# Patient Record
Sex: FEMALE | URBAN - METROPOLITAN AREA
[De-identification: names, ages, dates, MRNs, and addresses within clinical notes are randomized per-mention and may not be internally consistent; named-entity substitution may affect disease eponyms.]

---

## 2023-06-02 ENCOUNTER — OFFICE VISIT (OUTPATIENT)
Dept: OCCUPATIONAL MEDICINE | Age: 18
End: 2023-06-02
Attending: PHYSICIAN ASSISTANT

## 2023-06-05 ENCOUNTER — OFFICE VISIT (OUTPATIENT)
Dept: OCCUPATIONAL MEDICINE | Age: 18
End: 2023-06-05
Attending: PHYSICIAN ASSISTANT

## 2024-04-03 ENCOUNTER — HOSPITAL ENCOUNTER (OUTPATIENT)
Age: 19
Discharge: HOME | End: 2024-04-03
Payer: COMMERCIAL

## 2024-04-03 VITALS
BODY MASS INDEX: 21.5 KG/M2 | WEIGHT: 137 LBS | RESPIRATION RATE: 18 BRPM | HEART RATE: 90 BPM | DIASTOLIC BLOOD PRESSURE: 69 MMHG | HEIGHT: 67 IN | OXYGEN SATURATION: 98 % | SYSTOLIC BLOOD PRESSURE: 104 MMHG | TEMPERATURE: 98 F

## 2024-04-03 DIAGNOSIS — R05.1 ACUTE COUGH: Primary | ICD-10-CM

## 2024-04-03 DIAGNOSIS — J06.9 VIRAL URI: ICD-10-CM

## 2024-04-03 LAB
POCT INFLUENZA A: NEGATIVE
POCT INFLUENZA B: NEGATIVE
SARS-COV-2 RNA RESP QL NAA+PROBE: NOT DETECTED

## 2024-04-03 PROCEDURE — 99203 OFFICE O/P NEW LOW 30 MIN: CPT | Performed by: NURSE PRACTITIONER

## 2024-04-03 PROCEDURE — U0002 COVID-19 LAB TEST NON-CDC: HCPCS | Performed by: NURSE PRACTITIONER

## 2024-04-03 PROCEDURE — 87502 INFLUENZA DNA AMP PROBE: CPT | Performed by: NURSE PRACTITIONER

## 2024-04-03 RX ORDER — CITALOPRAM HYDROBROMIDE 10 MG/1
10 TABLET ORAL DAILY
COMMUNITY
Start: 2024-02-20

## 2024-04-03 NOTE — ED PROVIDER NOTES
Patient Seen in: [facility]      History     Chief Complaint   Patient presents with    Cough/URI     Stated Complaint: cough    Subjective:   HPI  Patient is a 18-year-old female who is here today with complaints of cough, congestion that started less than 1 week ago.    Objective:   Past Medical History:   Diagnosis Date    Depression               History reviewed. No pertinent surgical history.             Social History     Socioeconomic History    Marital status: Single   Tobacco Use    Smoking status: Never    Smokeless tobacco: Former   Substance and Sexual Activity    Alcohol use: Never    Drug use: Not Currently              Review of Systems    Positive for stated complaint: cough  Other systems are as noted in HPI.  Constitutional and vital signs reviewed.      All other systems reviewed and negative except as noted above.    Physical Exam     ED Triage Vitals [04/03/24 1021]   /69   Pulse 90   Resp 18   Temp 97.9 °F (36.6 °C)   Temp src Temporal   SpO2 98 %   O2 Device None (Room air)       Current:/69   Pulse 90   Temp 97.9 °F (36.6 °C) (Temporal)   Resp 18   Ht 170.2 cm (5' 7\")   Wt 62.1 kg   LMP 03/13/2024   SpO2 98%   BMI 21.46 kg/m²         Physical Exam  Adult physical exam:     VS: Vital signs reviewed. O2 saturation within normal limits for this patient     General: Patient is awake and alert, oriented to person, place and time. Not in acute distress.      HEENT: Head is normocephalic atraumatic. Pupils reactive bilaterally.  EOMs intact.  No facial droop or slurred speech.  No oral pallor. Mucous membranes moist.      Neck: No cervical lymphadenopathy. No stridor. Supple. No meningsmus.        Lungs: good inspiratory effort. +air entry bilaterally without wheezes, rhonchi, crackles.  No accessory muscle use or tachypnea.       Extremities: No edema.  Pulses 2+ extremities.   Brisk capillary refill noted.      Skin: Normal skin turgor     CNS: Moves all 4  extremities.  Interacts       ED Course     Labs Reviewed   POCT FLU TEST - Normal    Narrative:     This assay is a rapid molecular in vitro test utilizing nucleic acid amplification of influenza A and B viral RNA.   RAPID SARS-COV-2 BY PCR - Normal             I have personally  reviewed available prior medical records for any recent pertinent discharge summaries/testing. Patient/family updated on results and plan, a verbalized understanding and agreement with the plan.  I explained to the patient that emergent conditions may arise and to go to the ER for new, worsening or any persistent conditions. I've explained the importance of taking all medicatons as prescribed, follow up, and return precuations,  All questions answered.    Please note that this report has been produced using speech recognition software and may contain errors related to that system including, but not limited to, errors in grammar, punctuation, and spelling, as well as words and phrases that possibly may have been recognized inappropriately.  If there are any questions or concerns, contact the dictating provider for clarification.         MDM      Patient presents with cough, congestion for a little under 1 week. Nontoxic, does not meet SIRS criteria.   Patient does not have uvula deviation or unilateral tonsillar swelling to indicate tonsillar abscess. No meningsmus or trismus. No dysphagia or difficulty handing secretions. No evidence for otitis media. Patient does not have any respiratory distress.  O2 saturation within normal limits for this patient. Does not appear clinically dehydrated and is tolerating oral intake. Presentation consistent with a URI.  Flu Michelle is negative at this time.  Patient is eating and drinking without difficulty.. Encouraged patient on oral hydration and supportive care. Recommend follow up with PCP.  Return to ED precautions discussed with the patient and family.                                 Medical Decision  Making      Disposition and Plan     Clinical Impression:  1. Acute cough    2. Viral URI         Disposition:  Discharge  4/3/2024 11:00 am    Follow-up:  No follow-up provider specified.        Medications Prescribed:  Discharge Medication List as of 4/3/2024 11:02 AM

## 2024-04-03 NOTE — DISCHARGE INSTRUCTIONS
-Acetaminophen  orally every 4 hours as needed for fever or pain. Do not combine with other products containing acetaminophen.      -Ibuprofen   orally every 6 hours as needed for fever or pain. Take with food. Discontinue use and seek medical attention with blood in vomit or stool, coffee ground vomit, or dark black stool.     -Encourage fluids.     -Use a cool air humidifier as needed.     -May administer 5 mL of organic honey as needed for cough in patients greater than 1 year of age.     Do not administer over-the-counter cough and cold medications without discussing with your physician or the pharmacist as they may not be safe in children.     -Follow-up with a primary care provider within 3 days if fever persists or sooner if necessary.     -Please monitor for and seek medical attention with persistent fever, difficulties breathing, increased work of breathing, abdominal pain, persistent vomiting with concerns for dehydration, or any other concerns.

## 2024-04-03 NOTE — ED INITIAL ASSESSMENT (HPI)
Pt c/o cough, congestion, chest burning with cough started 3/28/24, pt also c/o bilateral eye itching started 4/2/24.

## 2024-06-19 ENCOUNTER — OFFICE VISIT (OUTPATIENT)
Dept: FAMILY MEDICINE CLINIC | Facility: CLINIC | Age: 19
End: 2024-06-19

## 2024-06-19 VITALS
HEIGHT: 67 IN | HEART RATE: 107 BPM | DIASTOLIC BLOOD PRESSURE: 58 MMHG | OXYGEN SATURATION: 98 % | RESPIRATION RATE: 18 BRPM | TEMPERATURE: 97 F | WEIGHT: 130 LBS | SYSTOLIC BLOOD PRESSURE: 94 MMHG | BODY MASS INDEX: 20.4 KG/M2

## 2024-06-19 DIAGNOSIS — J02.9 SORE THROAT: Primary | ICD-10-CM

## 2024-06-19 DIAGNOSIS — J06.9 VIRAL URI: ICD-10-CM

## 2024-06-19 LAB
CONTROL LINE PRESENT WITH A CLEAR BACKGROUND (YES/NO): YES YES/NO
KIT LOT #: NORMAL NUMERIC
STREP GRP A CUL-SCR: NEGATIVE

## 2024-06-19 PROCEDURE — 87880 STREP A ASSAY W/OPTIC: CPT | Performed by: NURSE PRACTITIONER

## 2024-06-19 PROCEDURE — 3078F DIAST BP <80 MM HG: CPT | Performed by: NURSE PRACTITIONER

## 2024-06-19 PROCEDURE — 99213 OFFICE O/P EST LOW 20 MIN: CPT | Performed by: NURSE PRACTITIONER

## 2024-06-19 PROCEDURE — 3008F BODY MASS INDEX DOCD: CPT | Performed by: NURSE PRACTITIONER

## 2024-06-19 PROCEDURE — 3074F SYST BP LT 130 MM HG: CPT | Performed by: NURSE PRACTITIONER

## 2024-06-19 NOTE — PROGRESS NOTES
CHIEF COMPLAINT:     Chief Complaint   Patient presents with    Sore Throat       HPI:   Fred Sheriff is a 18 year old female presents to clinic with complaint of sore throat. Patient has had 3 days. Symptoms have been persisting since onset.  Patient reports following associated symptoms: congestion, clear colored nasal discharge, low grade fever. Patient denies headache. Patient denies nausea.  Patient denies rash. Patient denies history of strep throat. Patient denies strep pharyngitis exposure. Treating symptoms with none.    Current Outpatient Medications   Medication Sig Dispense Refill    citalopram 10 MG Oral Tab Take 1 tablet (10 mg total) by mouth daily.        Past Medical History:    Depression      Social History:  Social History     Socioeconomic History    Marital status: Single   Tobacco Use    Smoking status: Never    Smokeless tobacco: Former   Substance and Sexual Activity    Alcohol use: Never    Drug use: Not Currently     Social Determinants of Health      [facility]    Social Connections    [facility]    Housing Stability        REVIEW OF SYSTEMS:   GENERAL HEALTH: no change in appetite  SKIN: Per HPI  HEENT: denies ear pain, See HPI  RESPIRATORY: denies shortness of breath or wheezing  CARDIOVASCULAR: denies chest pain or palpitations   GI: denies vomiting or diarrhea  NEURO: denies dizziness or lightheadedness    EXAM:   BP 94/58   Pulse 107   Temp 97.3 °F (36.3 °C)   Resp 18   Ht 5' 7\" (1.702 m)   Wt 130 lb (59 kg)   LMP 05/26/2024 (Exact Date)   SpO2 98%   BMI 20.36 kg/m²   GENERAL: well developed, well nourished,in no apparent distress  SKIN: no rashes,no suspicious lesions  HEAD: atraumatic, normocephalic  EYES: conjunctiva clear, EOM intact  EARS: TM's clear, non-injected, no bulging, retraction, or fluid bilaterally  NOSE: nostrils patent,  clear nasal discharge, nasal mucosa pink and non-inflamed  THROAT: oral mucosa pink, moist. Posterior pharynx is not erythematous and injected. No exudates. Tonsils 2/4.  Breath is not malodorous   NECK: supple  LUNGS: clear to auscultation bilaterally, no wheezes or rhonchi. Breathing is non labored.  CARDIO: RRR without murmur  GI: good BS's,no masses, hepatosplenomegaly, or tenderness on direct palpation  EXTREMITIES: no cyanosis, clubbing or edema  LYMPH: No anterior cervical or submandibular lymphadenopathy.  No posterior cervical or occipital lymphadenopathy.    Recent Results (from the past 24 hour(s))   Strep A Assay W/Optic    Collection Time: 06/19/24  8:55 AM   Result Value Ref Range    Strep Grp A Screen Negative Negative    Control Line Present with a clear background (yes/no) yes Yes/No    Kit Lot # [id number] Numeric    Kit Expiration Date 05/21/2025 Date         ASSESSMENT AND PLAN:   Assessment:     Encounter Diagnoses   Name Primary?    Sore throat Yes    Viral URI        Orders Placed This Encounter   Procedures    Strep A Assay W/Optic       Meds & Refills for this Visit:  Requested Prescriptions      No prescriptions requested or ordered in this encounter       Imaging & Consults:  None    Plan: Discussed that due to symptoms and negative rapid strep this is most likely viral and does not require antibiotics.     Discussed possibility of mononucleosis infection, but at this time symptoms are not reflective of mono infection.  If s/sx persist will consider MonoSpot or further lab work.     Comfort measures explained and discussed as listed in Patient Instructions    Follow up with PCP in 3-5 days if not improving, condition worsens, or fever greater than or equal to 100.4 persists for 72 hours.    The patient/parent indicates understanding of these issues and agrees to the plan.

## 2024-08-27 ENCOUNTER — OFFICE VISIT (OUTPATIENT)
Dept: FAMILY MEDICINE CLINIC | Facility: CLINIC | Age: 19
End: 2024-08-27
Payer: COMMERCIAL

## 2024-08-27 VITALS
RESPIRATION RATE: 18 BRPM | TEMPERATURE: 98 F | DIASTOLIC BLOOD PRESSURE: 70 MMHG | HEART RATE: 104 BPM | SYSTOLIC BLOOD PRESSURE: 107 MMHG | HEIGHT: 67 IN | OXYGEN SATURATION: 98 % | WEIGHT: 137 LBS | BODY MASS INDEX: 21.5 KG/M2

## 2024-08-27 DIAGNOSIS — Z11.52 ENCOUNTER FOR SCREENING FOR COVID-19: ICD-10-CM

## 2024-08-27 DIAGNOSIS — J02.9 SORE THROAT: ICD-10-CM

## 2024-08-27 DIAGNOSIS — J02.9 PHARYNGITIS, UNSPECIFIED ETIOLOGY: Primary | ICD-10-CM

## 2024-08-27 LAB
CONTROL LINE PRESENT WITH A CLEAR BACKGROUND (YES/NO): YES YES/NO
KIT LOT #: NORMAL NUMERIC
OPERATOR ID: NORMAL
POCT LOT NUMBER: NORMAL
RAPID SARS-COV-2 BY PCR: NOT DETECTED

## 2024-08-27 PROCEDURE — 3074F SYST BP LT 130 MM HG: CPT | Performed by: PHYSICIAN ASSISTANT

## 2024-08-27 PROCEDURE — U0002 COVID-19 LAB TEST NON-CDC: HCPCS | Performed by: PHYSICIAN ASSISTANT

## 2024-08-27 PROCEDURE — 87081 CULTURE SCREEN ONLY: CPT | Performed by: PHYSICIAN ASSISTANT

## 2024-08-27 PROCEDURE — 3078F DIAST BP <80 MM HG: CPT | Performed by: PHYSICIAN ASSISTANT

## 2024-08-27 PROCEDURE — 99213 OFFICE O/P EST LOW 20 MIN: CPT | Performed by: PHYSICIAN ASSISTANT

## 2024-08-27 PROCEDURE — 3008F BODY MASS INDEX DOCD: CPT | Performed by: PHYSICIAN ASSISTANT

## 2024-08-27 PROCEDURE — 87880 STREP A ASSAY W/OPTIC: CPT | Performed by: PHYSICIAN ASSISTANT

## 2024-08-27 NOTE — PROGRESS NOTES
CHIEF COMPLAINT:     Chief Complaint   Patient presents with    Fever     Sore throat, vomiting, and 102 fever.  Symptoms started on Sunday   OTC: None  No exposure        HPI:   Fred Sheriff is a 18 year old female who presents with sore throat and fever for 2 days. Vomiting yesterday x 4 but no vomiting so far today.       Associated symptoms:    Fever/Chills  Yes tmax 102  Sore throat  Yes  Cough  No   Congestion No  Bodyache  No  Headache  Yes  Chest pain No  SOB/Dyspnea No  Loss of taste No  Loss of smell No  Diarrhea No  Vomiting Yes, 4 episodes yesterday.  No blood.     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     Current Outpatient Medications   Medication Sig Dispense Refill    citalopram 10 MG Oral Tab Take 1 tablet (10 mg total) by mouth daily.        Past Medical History:    Depression      Social History:  Social History     Socioeconomic History    Marital status: Single   Tobacco Use    Smoking status: Never    Smokeless tobacco: Former   Substance and Sexual Activity    Alcohol use: Never    Drug use: Not Currently     Social Determinants of Health      [facility]    Social Connections    [facility]    Housing Stability        Review of Systems:    Positive for stated complaint: sore throat and fever.   Pertinent positives and negatives noted in the the HPI.    EXAM:   /70   Pulse 104   Temp 98.3 °F (36.8 °C) (Oral)   Resp 18   Ht 5' 7\" (1.702 m)   Wt 137 lb (62.1 kg)   LMP 08/10/2024 (Exact Date)   SpO2 98%   BMI 21.46 kg/m²   GENERAL: well developed, well nourished,in no apparent distress  SKIN: no rashes,no suspicious lesions  HEAD: atraumatic, normocephalic  EYES: conjunctiva clear, sclera white,  PERRLA  EARS: TM's non erythematous  NOSE: nares patent, mucosa mild congestion  THROAT: Posterior pharynx is  erythematous,  tonsils 2 + symmetric, no exudate, small pnd.   NECK: supple, non-tender  LUNGS: clear to auscultation bilaterally without rale, ronchi, wheeze.  CARDIO: S1/S2 without murmur  GI: BS's present x4. No palpable masses or organomegaly.  no tenderness on palpation.  EXTREMITIES: no cyanosis, clubbing or edema  LYMPH:  small tonsillar lymphadenopathy.      Recent Results (from the past 24 hour(s))   Strep A Assay W/Optic    Collection Time: 08/27/24 11:37 AM   Result Value Ref Range    Strep Grp A Screen neg Negative    Control Line Present with a clear background (yes/no) yes Yes/No    Kit Lot # [id number] Numeric    Kit Expiration Date 5/21/25 Date   COVID-19 LAB TEST NON-CDC    Collection Time: 08/27/24 11:39 AM    Specimen: Nares   Result Value Ref Range    Rapid SARS-CoV-2 by PCR Not Detected Not Detected    POCT Lot Number [id number]     POCT Expiration Date 12/18/25     POCT Procedure Control Control Valid Control Valid     ,091          ASSESSMENT AND PLAN:   Fred Sheriff is a 18 year old female who presents with Fever (Sore throat, vomiting, and 102 fever.  Symptoms started on Sunday /OTC: None/No exposure ). Symptoms are consistent with:      ASSESSMENT:  Encounter Diagnoses   Name Primary?    Sore throat     Encounter for screening for COVID-19     Pharyngitis, unspecified etiology Yes         PLAN: Covid Test Rapid ID Now is negative.    RSS is negative.  Follow up ctx sent.      Symptomatic care:   1. Rest. Drink plenty of fluids.  2. Tylenol or ibuprofen for discomfort or fever.   3. OTC decongestant (phenylephrine) expectorants (guaifenesin), nasal steroid sprays  (fluticasone) may be helpful        for congestion.  4. OTC cough suppressant for cough  (dextromethorphan)  5. Chloraseptic spray/throat lozenges for sore throat       Go to the ED for evaluation with progressive symptoms of difficulty swallowing, breathing, shortness of breath, chest pain, extreme weakness, or confusion.         Meds  & Refills for this Visit:  Requested Prescriptions      No prescriptions requested or ordered in this encounter       Risks, benefits, side effects of medication addressed and explained.    There are no Patient Instructions on file for this visit.    The patient indicates understanding of these issues and agrees to the plan.  The patient is asked to follow up PCP

## 2024-10-01 ENCOUNTER — OFFICE VISIT (OUTPATIENT)
Dept: FAMILY MEDICINE CLINIC | Facility: CLINIC | Age: 19
End: 2024-10-01
Payer: COMMERCIAL

## 2024-10-01 VITALS
RESPIRATION RATE: 18 BRPM | HEART RATE: 96 BPM | TEMPERATURE: 98 F | BODY MASS INDEX: 21.19 KG/M2 | OXYGEN SATURATION: 98 % | WEIGHT: 135 LBS | HEIGHT: 67 IN | DIASTOLIC BLOOD PRESSURE: 68 MMHG | SYSTOLIC BLOOD PRESSURE: 90 MMHG

## 2024-10-01 DIAGNOSIS — J02.9 SORE THROAT: ICD-10-CM

## 2024-10-01 DIAGNOSIS — J03.90 ACUTE TONSILLITIS, UNSPECIFIED ETIOLOGY: Primary | ICD-10-CM

## 2024-10-01 LAB
CONTROL LINE PRESENT WITH A CLEAR BACKGROUND (YES/NO): YES YES/NO
KIT LOT #: NORMAL NUMERIC

## 2024-10-01 PROCEDURE — 99213 OFFICE O/P EST LOW 20 MIN: CPT | Performed by: FAMILY MEDICINE

## 2024-10-01 PROCEDURE — 87880 STREP A ASSAY W/OPTIC: CPT | Performed by: FAMILY MEDICINE

## 2024-10-01 PROCEDURE — 3078F DIAST BP <80 MM HG: CPT | Performed by: FAMILY MEDICINE

## 2024-10-01 PROCEDURE — 3008F BODY MASS INDEX DOCD: CPT | Performed by: FAMILY MEDICINE

## 2024-10-01 PROCEDURE — 3074F SYST BP LT 130 MM HG: CPT | Performed by: FAMILY MEDICINE

## 2024-10-01 RX ORDER — AMOXICILLIN 500 MG/1
500 CAPSULE ORAL 2 TIMES DAILY
Qty: 20 CAPSULE | Refills: 0 | Status: SHIPPED | OUTPATIENT
Start: 2024-10-01 | End: 2024-10-07 | Stop reason: ALTCHOICE

## 2024-10-01 NOTE — PROGRESS NOTES
Fred Sheriff is a 18 year old female.    S:  Patient presents today with the following concerns:  Chief Complaint   Patient presents with    Sore Throat     Severe leg cramps, headache, sore throat, and fever ( 101.7 this morning)   Symptoms started last night   OTC: None   NO exposure    Works in childcare.    Was having nausea.  No vomiting or diarrhea or cough or congestion.  Leg cramps and sore throat are worst symptoms.       Current Outpatient Medications   Medication Sig Dispense Refill    amoxicillin 500 MG Oral Cap Take 1 capsule (500 mg total) by mouth 2 (two) times daily for 10 days. 20 capsule 0    citalopram 10 MG Oral Tab Take 1 tablet (10 mg total) by mouth daily.       There is no problem list on file for this patient.    No family history on file.    REVIEW OF SYSTEMS:  GENERAL: feels unwell.  SKIN: denies any unusual skin lesions  EYES:denies vision change  LUNGS: denies shortness of breath with exertion  CARDIOVASCULAR: denies chest pain on exertion  GI: denies abdominal pain.  No V/D/C  : denies dysuria  MUSCULOSKELETAL: body aches  NEURO:  headaches    EXAM:  BP 90/68   Pulse 96   Temp 98.4 °F (36.9 °C)   Resp 18   Ht 5' 7\" (1.702 m)   Wt 135 lb (61.2 kg)   LMP 09/01/2024 (Approximate)   SpO2 98%   BMI 21.14 kg/m²   Physical Exam  Constitutional:       General: She is not in acute distress.     Appearance: Normal appearance. She is not ill-appearing, toxic-appearing or diaphoretic.   HENT:      Head: Normocephalic and atraumatic.      Right Ear: Tympanic membrane and ear canal normal.      Left Ear: Tympanic membrane and ear canal normal.      Nose: Nose normal.      Mouth/Throat:      Mouth: Mucous membranes are moist.      Pharynx: Posterior oropharyngeal erythema present. No oropharyngeal exudate.      Comments: Tonsils 3+ with erythema.  Malodorous.  Eyes:      Extraocular Movements: Extraocular movements intact.      Conjunctiva/sclera: Conjunctivae normal.      Pupils: Pupils  are equal, round, and reactive to light.   Cardiovascular:      Rate and Rhythm: Normal rate and regular rhythm.   Pulmonary:      Effort: Pulmonary effort is normal.      Breath sounds: Normal breath sounds.   Musculoskeletal:      Cervical back: Neck supple. Tenderness present. No rigidity.   Lymphadenopathy:      Cervical: Cervical adenopathy present.   Skin:     General: Skin is warm and dry.   Neurological:      General: No focal deficit present.      Mental Status: She is alert and oriented to person, place, and time.   Psychiatric:         Mood and Affect: Mood normal.         Behavior: Behavior normal.        ASSESSMENT AND PLAN:  Fred Sheriff is a 18 year old female.  Encounter Diagnoses   Name Primary?    Acute tonsillitis, unspecified etiology Yes    Sore throat        No results found.     Orders Placed This Encounter   Procedures    Strep A Assay W/Optic     Meds & Refills for this Visit:  Requested Prescriptions     Signed Prescriptions Disp Refills    amoxicillin 500 MG Oral Cap 20 capsule 0     Sig: Take 1 capsule (500 mg total) by mouth 2 (two) times daily for 10 days.     Imaging & Consults:  None    Note written excusing her from work today.  She declines other respiratory testing.    Will print RX for amoxicillin.  If sore throat not improving in 2-3 days and no other respiratory symptoms develop to get this filled.  Take ibuprofen 600 mg every 6-8 hours prn.  Push fluids, salt water gargles.    Follow up if symptoms change, worsen, do not improve.    Patient verbalizes understanding of plan.  No follow-ups on file.

## 2024-10-06 NOTE — PROGRESS NOTES
Chief Complaint   Patient presents with    [facility]     Mental health care   Medication refill     Care Gap Management     Vaccines          HPI  Fred Sheriff is a 18 year old female here for new patient visit and physical.    Last colon cancer screen:  n/a    Last mammo: -n/a    Last Pap: -n/a    Acute concerns:  Patient presents with concerns about possible bipolar II disorder.    Pt reports having manic episodes, high sex drive, odd behaviors, waking up in the middle of the night, and random daytime sleepiness during her freshman year. She experiences high moods followed by low moods. The patient has been on citalopram 10 mg for depression for a year and a half, which has improved her symptoms.  Per pt, she tried increasing her citalopram and experienced worsening manic symptoms. She still experiences racing thoughts and impulsivity but can control them better. Pt denies any thoughts of harming herself or others.  She manages anxiety with essential oils and journaling.     Pt also reports worsening menstrual symptoms, including nausea, dizzy spells, and severe leg pain during her periods, which affects her work. Her last menstrual period started on 9/2, with regular monthly bleeding lasting 5-7 days. She has an appointment with Ob/gyn to discuss this in detail in 2 weeks.      Discussed:  - diet: vegetarian   - exercise: running  - sleep: adequate  - stress: manageable  - gyne: LMP: 10/2, monthly, mod bleeding, lasts 5-7 days, reports PMDD  - vision: UTD  - dentist visit: UTD  - STD screening: never done, will follow up with ob/gyn    Attends [employer] for early childhood education; works as a     ROS  As per HPI      Depression Screening (PHQ-2/PHQ-9): Over the LAST 2 WEEKS   Little interest or pleasure in doing things: Not at all    Feeling down, depressed, or hopeless: Not at all    PHQ-2 SCORE: 0          Past Medical History:    Depression       History reviewed. No  pertinent surgical history.    Social History     Socioeconomic History    Marital status: Single   Tobacco Use    Smoking status: Never    Smokeless tobacco: Former   Vaping Use    Vaping status: Never Used   Substance and Sexual Activity    Alcohol use: Never    Drug use: Not Currently    Sexual activity: Yes       Family History   Problem Relation Age of Onset    Depression Paternal Grandfather     Bipolar Disorder Paternal Grandfather     Other (Type 1 diabetes) Paternal Grandfather     Depression Paternal Aunt         No current outpatient medications on file prior to visit.     No current facility-administered medications on file prior to visit.       Immunization History   Administered Date(s) Administered    Covid-19 Vaccine Pfizer 30 mcg/0.3 ml 06/26/2021, 07/20/2021, 01/28/2022            Objective  Vitals:    10/07/24 1333   BP: 98/60   Pulse: 77   Resp: 16   Temp: 97.4 °F (36.3 °C)   SpO2: 99%   Weight: 142 lb 6.4 oz (64.6 kg)   Height: 5' 6.54\" (1.69 m)   Body mass index is 22.62 kg/m².    Physical Exam  Constitutional:       Appearance: Normal appearance.   HEENT:      Head: Normocephalic and atraumatic.      Eyes: PERRLA no notable nystagmus     Ears: normal on observation     Nose: Nose normal.      Mouth: Mucous membranes are moist.      Neck: no lymphadenopathy, thyroid not enlarged  Cardiovascular:      Rate and Rhythm: Normal rate and regular rhythm.   Pulmonary:      Effort: Pulmonary effort is normal.      Breath sounds: Normal breath sounds.   Abdominal:      General: Bowel sounds are normal.      Palpations: Abdomen is soft. There is no mass.   Musculoskeletal:         General: Normal range of motion.      Cervical back: Normal range of motion.   Skin:     General: Skin is warm and dry.   Neurological:      General: No focal deficit present.      Mental Status: Alert and oriented to person, place, and time.   Psychiatric:         Mood and Affect: Mood normal.         Thought Content: Thought  content normal.       Assessment and Plan  Fred was seen today for establish care and care gap management.    Diagnoses and all orders for this visit:    Annual physical exam    Anxiety and depression  Comments:  Well controlled with citalopram 10mg daily  Concerned about possible bipolar disorder  Refer to [facility] for psychiatrist evaluation  Orders:  -     [facility] Referral - In Network  -     citalopram 10 MG Oral Tab; Take 1 tablet (10 mg total) by mouth daily.    Vitamin D deficiency  Comments:  Contine otc vitamin D supplements    Need for vaccination  Comments:  Awaiting medical records from previous care provider       Patient presents for annual exam  - Previous lab reviewed, unremarkable  - Discussed signing up for patient portal as means of communicating with me directly  - Discussed importance of communicating with me if has not heard back with results, etc  - Discussed age-appropriate vaccinations and screenings  - Pt verbalizes understanding, all questions/concerns addressed, in agreement w/plan    Follow up  Return in about 1 year (around 10/7/2025), or if symptoms worsen or fail to improve, for annual physical.      Patient Instructions  Patient Instructions   It was nice to meet you!  I will reach out via Kaye Crump MD

## 2024-10-07 ENCOUNTER — OFFICE VISIT (OUTPATIENT)
Dept: FAMILY MEDICINE CLINIC | Facility: CLINIC | Age: 19
End: 2024-10-07
Payer: COMMERCIAL

## 2024-10-07 VITALS
RESPIRATION RATE: 16 BRPM | BODY MASS INDEX: 22.61 KG/M2 | HEIGHT: 66.54 IN | OXYGEN SATURATION: 99 % | SYSTOLIC BLOOD PRESSURE: 98 MMHG | HEART RATE: 77 BPM | TEMPERATURE: 97 F | WEIGHT: 142.38 LBS | DIASTOLIC BLOOD PRESSURE: 60 MMHG

## 2024-10-07 DIAGNOSIS — Z23 NEED FOR VACCINATION: ICD-10-CM

## 2024-10-07 DIAGNOSIS — F32.A ANXIETY AND DEPRESSION: ICD-10-CM

## 2024-10-07 DIAGNOSIS — F41.9 ANXIETY AND DEPRESSION: ICD-10-CM

## 2024-10-07 DIAGNOSIS — E55.9 VITAMIN D DEFICIENCY: ICD-10-CM

## 2024-10-07 DIAGNOSIS — Z00.00 ANNUAL PHYSICAL EXAM: Primary | ICD-10-CM

## 2024-10-07 RX ORDER — CITALOPRAM HYDROBROMIDE 10 MG/1
10 TABLET ORAL DAILY
Qty: 60 TABLET | Refills: 1 | Status: SHIPPED | OUTPATIENT
Start: 2024-10-07

## 2024-10-09 ENCOUNTER — TELEPHONE (OUTPATIENT)
Age: 19
End: 2024-10-09

## 2024-10-21 ENCOUNTER — TELEPHONE (OUTPATIENT)
Age: 19
End: 2024-10-21

## 2024-11-12 ENCOUNTER — OFFICE VISIT (OUTPATIENT)
Dept: OBGYN CLINIC | Facility: CLINIC | Age: 19
End: 2024-11-12
Payer: COMMERCIAL

## 2024-11-12 VITALS
SYSTOLIC BLOOD PRESSURE: 104 MMHG | DIASTOLIC BLOOD PRESSURE: 62 MMHG | WEIGHT: 146.81 LBS | BODY MASS INDEX: 23 KG/M2 | HEART RATE: 78 BPM

## 2024-11-12 DIAGNOSIS — Z12.4 SCREENING FOR CERVICAL CANCER: ICD-10-CM

## 2024-11-12 DIAGNOSIS — Z01.419 WELL WOMAN EXAM WITH ROUTINE GYNECOLOGICAL EXAM: Primary | ICD-10-CM

## 2024-11-12 DIAGNOSIS — N94.6 DYSMENORRHEA: ICD-10-CM

## 2024-11-12 PROCEDURE — 99459 PELVIC EXAMINATION: CPT | Performed by: NURSE PRACTITIONER

## 2024-11-12 PROCEDURE — 3074F SYST BP LT 130 MM HG: CPT | Performed by: NURSE PRACTITIONER

## 2024-11-12 PROCEDURE — 3078F DIAST BP <80 MM HG: CPT | Performed by: NURSE PRACTITIONER

## 2024-11-12 PROCEDURE — 88175 CYTOPATH C/V AUTO FLUID REDO: CPT | Performed by: NURSE PRACTITIONER

## 2024-11-12 PROCEDURE — 99395 PREV VISIT EST AGE 18-39: CPT | Performed by: NURSE PRACTITIONER

## 2024-11-12 NOTE — PROGRESS NOTES
Subjective:  Chief Complaint   Patient presents with    Physical     Annual exam      19 year old female  presents for annual.    Pt notes dysmenorrhea    Patient's last menstrual period was 2024 (exact date).  Hx Prior Abnormal Pap: No  Pap Result Notes: Pt has never had a pap  Menarche: 13 (2024  2:40 PM)  Period Cycle (Days): 28-30 days (2024  2:40 PM)  Period Duration (Days): 4-6 days (2024  2:40 PM)  Period Flow: moderate (2024  2:40 PM)  Hx Prior Abnormal Pap: No (2024  2:40 PM)  Pap Result Notes: Pt has never had a pap (2024  2:40 PM)    Declines STD screen  Contraception: condoms, happy with this method    Denies family history of ovarian and colon CA.  She thinks M great aunt with history of breast CA    Feeling safe at home.    Most Recent Immunizations   Administered Date(s) Administered    Covid-19 Vaccine Pfizer 30 mcg/0.3 ml 2022      reports that she has never smoked. She has never used smokeless tobacco.   reports current alcohol use.    Past Medical History:    Depression     History reviewed. No pertinent surgical history.    Review of Systems:  Pertinent items are noted in the HPI.    Objective:  /62   Pulse 78   Wt 146 lb 12.8 oz (66.6 kg)   LMP 2024 (Exact Date)   BMI 23.31 kg/m²    Physical Examination:  General appearance: Well dressed, well nourished in no apparent distress  Neurologic/Psychiatric: Alert and oriented to person, place and time, mood normal, affect appropriate  Head: Normocephalic without obvious deformity, atraumatic  Neck: No thyromegaly, supple, non-tender, no masses, no adenopathy  Lungs: Clear to auscultation bilaterally, no rales, wheezes or rhonchi  Breasts: Symmetric, non-tender, no masses, lesions, retraction, dimpling or discharge bilaterally, no axillary or supraclavicular lymphadenopathy  Heart: Regular rate and rhythm, no gallops or murmurs  Abdomen: Soft, non-tender, non-distended, no masses,  no hepatosplenomegaly, no hernias, no inguinal lymphadenopathy  Pelvic:    External genitalia- Normal, Bartholin's, urethra, skeins glands normal   Vagina- No vaginal lesions, physiologic discharge   Cervix- No lesions, long/closed, no cervical motion tenderness   Uterus- Normal sized, non-tender, no masses   Adnexa-  Non-tender, no masses  Extremities: Non-tender, full range of motion, no clubbing, cyanosis or edema  Skin:  General inspection- no rashes, lesions or discoloration  Pap smear done    Assessment/Plan:  Normal well-woman exam.  Declines STD screen  Pap smear obtained.    Patient offered chaperone for exam, declined    Diagnoses and all orders for this visit:    Well woman exam with routine gynecological exam  - self breast exam discussed and encouraged    Screening for cervical cancer  -     ThinPrep PAP with HPV Reflex Request B; Future    Dysmenorrhea  - discussed NSAID vs hormonal contraceptive  - pt desires NSAID  - to follow up with new/worsening symptoms or no improvement      Return in about 1 year (around 11/12/2025) for annual well woman exam or sooner if needed.

## 2024-11-18 LAB
.: NORMAL
.: NORMAL

## 2024-11-19 ENCOUNTER — PATIENT MESSAGE (OUTPATIENT)
Dept: FAMILY MEDICINE CLINIC | Facility: CLINIC | Age: 19
End: 2024-11-19

## 2024-11-19 NOTE — TELEPHONE ENCOUNTER
----- Message from Shanthi Crump sent at 11/19/2024  3:29 AM CST -----  [employer] message sent:  Pap smear is normal. Repeat  pap smear in 3 years

## 2024-11-20 NOTE — TELEPHONE ENCOUNTER
Message left on patient's identified cell phone - ok to leave results per verbal consent.  Pap smear negative - to repeat in 3 years

## 2025-01-13 ENCOUNTER — OFFICE VISIT (OUTPATIENT)
Dept: FAMILY MEDICINE CLINIC | Facility: CLINIC | Age: 20
End: 2025-01-13
Payer: COMMERCIAL

## 2025-01-13 VITALS
BODY MASS INDEX: 24.17 KG/M2 | DIASTOLIC BLOOD PRESSURE: 56 MMHG | TEMPERATURE: 98 F | OXYGEN SATURATION: 99 % | HEIGHT: 67 IN | HEART RATE: 105 BPM | RESPIRATION RATE: 16 BRPM | WEIGHT: 154 LBS | SYSTOLIC BLOOD PRESSURE: 98 MMHG

## 2025-01-13 DIAGNOSIS — F41.9 ANXIETY AND DEPRESSION: ICD-10-CM

## 2025-01-13 DIAGNOSIS — Z30.09 ENCOUNTER FOR COUNSELING REGARDING CONTRACEPTION: Primary | ICD-10-CM

## 2025-01-13 DIAGNOSIS — F32.A ANXIETY AND DEPRESSION: ICD-10-CM

## 2025-01-13 LAB
CONTROL LINE PRESENT WITH A CLEAR BACKGROUND (YES/NO): YES YES/NO
KIT LOT #: NORMAL NUMERIC
PREGNANCY TEST, URINE: NEGATIVE

## 2025-01-13 RX ORDER — CITALOPRAM HYDROBROMIDE 10 MG/1
10 TABLET ORAL DAILY
Qty: 60 TABLET | Refills: 1 | Status: SHIPPED | OUTPATIENT
Start: 2025-01-13

## 2025-01-13 RX ORDER — DROSPIRENONE AND ETHINYL ESTRADIOL 0.02-3(28)
1 KIT ORAL DAILY
Qty: 28 TABLET | Refills: 12 | Status: SHIPPED | OUTPATIENT
Start: 2025-01-13 | End: 2026-01-13

## 2025-01-13 NOTE — PROGRESS NOTES
Chief Complaint   Patient presents with    Contraception     Pill option          HPI  Fred Sheriff is a 19 year old F pt who presents today for contraceptive management.     She has no prior history of using contraception and has not considered any specific options.   Patient mentions taking Plan B about a week ago after sexual intercourse and experienced spotting a few days later. LMP: 12/30/2025, monthly, mod bleeding, lasts 5 days. She denies any pelvic pain or pressure. Patient does not smoke cig or use recreational drugs. She denies migraines with aura.    ROS  As per HPI    Past Medical History:    Depression       History reviewed. No pertinent surgical history.    Social History     Socioeconomic History    Marital status: Single   Tobacco Use    Smoking status: Never    Smokeless tobacco: Never   Vaping Use    Vaping status: Former    Substances: Nicotine   Substance and Sexual Activity    Alcohol use: Yes     Comment: socially    Drug use: Not Currently    Sexual activity: Yes   Other Topics Concern    Caffeine Concern Yes    Exercise Yes    Seat Belt Yes       Family History   Problem Relation Age of Onset    Alcohol and Other Disorders Associated Maternal Grandfather     Depression Paternal Grandfather     Bipolar Disorder Paternal Grandfather     Other (Type 1 diabetes) Paternal Grandfather     Alcohol and Other Disorders Associated Paternal Grandfather     Suicide History Paternal Grandfather         attempted    Bipolar Disorder Paternal Aunt     Depression Paternal Aunt     Suicide History Cousin         2 paternal cousins attempted suicide        Medications Ordered Prior to Encounter[1]      Objective  Vitals:    01/13/25 1230   BP: 98/56   Pulse: 105   Resp: 16   Temp: 97.6 °F (36.4 °C)   SpO2: 99%   Weight: 154 lb (69.9 kg)   Height: 5' 7\" (1.702 m)     Body mass index is 24.12 kg/m².    Physical Exam  Constitutional:       Appearance: Normal appearance.   HEENT:      Head: Normocephalic  and atraumatic.   Cardiovascular:      Rate and Rhythm: Normal rate and regular rhythm.   Pulmonary:      Effort: Pulmonary effort is normal.      Breath sounds: Normal breath sounds.   Musculoskeletal:         General: Normal range of motion.   Skin:     General: Skin is warm and dry.   Neurological:      General: No focal deficit present.      Mental Status: Alert and oriented to person, place, and time.   Psychiatric:         Mood and Affect: Mood normal.         Thought Content: Thought content normal.       Assessment and Plan  Fred was seen today for contraception.    Diagnoses and all orders for this visit:    Encounter for counseling regarding contraception  Comments:  Urine pregnancy is negative   Discussed contraceptive options, prefers pills  Discussed how/when to take med  Will follow up with ob/gyn for pap/pelvic exam  Orders:  -     Urine Preg Test  -     Drospirenone-Ethinyl Estradiol (LO-ZUMANDIMINE) 3-0.02 MG Oral Tab; Take 1 tablet by mouth daily.    Anxiety and depression  Comments:  Well controlled with citalopram 10mg daily  Refill provided  Orders:  -     citalopram 10 MG Oral Tab; Take 1 tablet (10 mg total) by mouth daily.       Patient has no PMH of cad/pe/dvt/cva. she has no fh of DVT/PE. she knows that these are potential serious risks of ocps. she will not smoke as smoking increases these risks. If she has chest pain/sob/leg pain/swelling she will see me or the ER ASAP. She understands that she must use condoms. she knows she must get pap smears and std testing per protocol. She understands we need to monitor for depression while on OCPs. Pt does not have migraines with aura and will let us know if this develops. Pt verbalizes understanding, all questions/concerns addressed, in agreement w/plan.        Follow up  Return if symptoms worsen or fail to improve.      Shanthi Crump MD          [1]   No current outpatient medications on file prior to visit.     No current  facility-administered medications on file prior to visit.

## 2025-02-18 ENCOUNTER — OFFICE VISIT (OUTPATIENT)
Dept: FAMILY MEDICINE CLINIC | Facility: CLINIC | Age: 20
End: 2025-02-18
Payer: COMMERCIAL

## 2025-02-18 VITALS
OXYGEN SATURATION: 98 % | HEART RATE: 106 BPM | BODY MASS INDEX: 25 KG/M2 | DIASTOLIC BLOOD PRESSURE: 61 MMHG | WEIGHT: 157 LBS | RESPIRATION RATE: 18 BRPM | SYSTOLIC BLOOD PRESSURE: 116 MMHG | TEMPERATURE: 98 F

## 2025-02-18 DIAGNOSIS — J02.0 STREP PHARYNGITIS: Primary | ICD-10-CM

## 2025-02-18 DIAGNOSIS — J11.1 INFLUENZA-LIKE ILLNESS: ICD-10-CM

## 2025-02-18 LAB
CONTROL LINE PRESENT WITH A CLEAR BACKGROUND (YES/NO): YES YES/NO
KIT LOT #: NORMAL NUMERIC
STREP GRP A CUL-SCR: POSITIVE

## 2025-02-18 PROCEDURE — 87880 STREP A ASSAY W/OPTIC: CPT | Performed by: NURSE PRACTITIONER

## 2025-02-18 PROCEDURE — 99213 OFFICE O/P EST LOW 20 MIN: CPT | Performed by: NURSE PRACTITIONER

## 2025-02-18 PROCEDURE — 87637 SARSCOV2&INF A&B&RSV AMP PRB: CPT | Performed by: NURSE PRACTITIONER

## 2025-02-18 RX ORDER — AMOXICILLIN 500 MG/1
500 CAPSULE ORAL 2 TIMES DAILY
Qty: 20 CAPSULE | Refills: 0 | Status: SHIPPED | OUTPATIENT
Start: 2025-02-18 | End: 2025-02-28

## 2025-02-18 NOTE — PROGRESS NOTES
CHIEF COMPLAINT:     Chief Complaint   Patient presents with    Fever     C/o fever this morning    Sore Throat       HPI:   Fred Sheriff is a 19 year old female who presents for sore throat, mild nasal symptoms, fevers, chills, and body aches.  Patient reports symptoms started this morning.  Denies any wheezing, chest discomfort, or shortness of breath. .  Treating symptoms with otc meds. Tolerates PO well at home. No n/v/d.  Denies any other aggravating or relieving factors at home. Denies any other treatment attempts prior to arrival.  Denies known covid-19 or strep exposure but does note that she works at a  and is around sick children at times.     Current Outpatient Medications   Medication Sig Dispense Refill    amoxicillin 500 MG Oral Cap Take 1 capsule (500 mg total) by mouth 2 (two) times daily for 10 days. 20 capsule 0    Drospirenone-Ethinyl Estradiol (LO-ZUMANDIMINE) 3-0.02 MG Oral Tab Take 1 tablet by mouth daily. 28 tablet 12    citalopram 10 MG Oral Tab Take 1 tablet (10 mg total) by mouth daily. 60 tablet 1      Past Medical History:    Depression      History reviewed. No pertinent surgical history.      Social History     Socioeconomic History    Marital status: Single   Tobacco Use    Smoking status: Never    Smokeless tobacco: Never   Vaping Use    Vaping status: Former    Substances: Nicotine   Substance and Sexual Activity    Alcohol use: Yes     Comment: socially    Drug use: Not Currently    Sexual activity: Yes   Other Topics Concern    Caffeine Concern Yes    Exercise Yes    Seat Belt Yes     Social Drivers of Health      [facility]    Housing Stability         REVIEW OF SYSTEMS:   GENERAL: + fevers. Notes good appetite  SKIN: no rashes or abnormal skin lesions  HEENT: + sore throat, + sinus symptoms, Denies ear pain  LUNGS: Denies cough, denies shortness of breath or wheezing,   CARDIOVASCULAR: denies chest  pain or palpitations   GI: denies N/V/C or abdominal pain  NEURO: Denies dizziness, numbness/tingling or weakness.    EXAM:   /61   Pulse 106   Temp 98.2 °F (36.8 °C) (Temporal)   Resp 18   Wt 157 lb (71.2 kg)   LMP 02/13/2025 (Approximate)   SpO2 98%   BMI 24.59 kg/m²   GENERAL: well developed, well nourished,in no apparent distress  SKIN: no rashes,no suspicious lesions  HEAD: atraumatic, normocephalic.    EYES: conjunctiva clear, PERRLA,EOM intact  EARS: TM's intact and without erythema, no bulging, no retraction,no fluid, bony landmarks visualized. No erythema or swelling noted to ear canals or external ears.   NOSE: Nostrils patent, clear nasal discharge, nasal mucosa reddened   THROAT: Oral mucosa pink, moist. Posterior pharynx is  erythematous. No exudates. Tonsils 2/4 bilat.  No trismus. Uvula midline with no swelling. Voice clear/normal. No stridor  NECK: Supple, non-tender  LUNGS: clear to auscultation bilaterally, no rales, wheezes or rhonchi. Breathing is non labored.  CARDIO: RRR without murmur  EXTREMITIES: no cyanosis, clubbing or edema  LYMPH:  + ant cerv lymphadenopathy.        ASSESSMENT AND PLAN:       ICD-10-CM    1. Strep pharyngitis  J02.0 Rapid Strep     SARS-CoV-2/Flu A and B/RSV by PCR ([facility]) [E]     amoxicillin 500 MG Oral Cap     SARS-CoV-2/Flu A and B/RSV by PCR ([facility]) [E]      2. Influenza-like illness  J11.1         Rapid strep positive  Viral panel testing sent to lab.      Discussed physical exam and hpi with pt.  Pt has reassuring physical exam consistent with pharyngitis. Rapid strep positive. No signs of pta or retropharyngeal infection.Lungs clear bilat. No respiratory distress noted. Will send viral panel to lab to rule out concurrent flu or covid infction Treatment options discussed with patient and explained in detail. We reviewed symptomatic care at home and will start amoxicillin for strep pharyngitis.. The risks, benefits and potential side effects of  possible medications were reviewed. Alternatives were discussed. Monitoring parameters and expected course outlined. We reviewed self quarantine guidelines for if covid-19 test is positive. . Patient to call PCP or go to emergency department if symptoms fail to respond as outlined, or worsen in any way. The patient agreed with the plan.         Patient Instructions   1. Rest. Drink plenty of fluids.  2. Tylenol/Ibuprofen for pain/fevers. Amoxicillin as prescribed.  3. Salt water gargles three times daily  4. Use humidifier at home when possible.  5. The rapid strep test is positive.  6. Covid-19/FLU/RSV testing sent to lab.  Self quarantine at this time. If covid-19 test is positive then please follow the listed guidelines below:  Home isolation until:  Your symptoms are improving AND  You are fever free for 24 hours without using fever reducing medications  Resume normal activities, and use added prevention strategies over the next five days.  Clean your hands often  wear a well-fitting mask when around others  keep a distance from others    7. Follow up with PMD in 4-5 days for re-eval. Go to the emergency department immediately if symptoms worsen, change, you develop chest discomfort, wheezing, shortness of breath, or if you have any concerns.

## 2025-02-19 LAB
FLUAV + FLUBV RNA SPEC NAA+PROBE: NOT DETECTED
FLUAV + FLUBV RNA SPEC NAA+PROBE: NOT DETECTED
RSV RNA SPEC NAA+PROBE: NOT DETECTED
SARS-COV-2 RNA RESP QL NAA+PROBE: NOT DETECTED

## 2025-06-02 ENCOUNTER — OFFICE VISIT (OUTPATIENT)
Dept: FAMILY MEDICINE CLINIC | Facility: CLINIC | Age: 20
End: 2025-06-02
Payer: COMMERCIAL

## 2025-06-02 VITALS
TEMPERATURE: 98 F | SYSTOLIC BLOOD PRESSURE: 102 MMHG | DIASTOLIC BLOOD PRESSURE: 70 MMHG | OXYGEN SATURATION: 99 % | HEART RATE: 88 BPM | HEIGHT: 65 IN | RESPIRATION RATE: 18 BRPM | BODY MASS INDEX: 27.49 KG/M2 | WEIGHT: 165 LBS

## 2025-06-02 VITALS
BODY MASS INDEX: 27.49 KG/M2 | WEIGHT: 165 LBS | HEART RATE: 88 BPM | DIASTOLIC BLOOD PRESSURE: 70 MMHG | HEIGHT: 65 IN | SYSTOLIC BLOOD PRESSURE: 102 MMHG | OXYGEN SATURATION: 98 % | RESPIRATION RATE: 18 BRPM | TEMPERATURE: 98 F

## 2025-06-02 DIAGNOSIS — Z02.9 ADMINISTRATIVE ENCOUNTER: Primary | ICD-10-CM

## 2025-06-02 DIAGNOSIS — Z02.89 ENCOUNTER FOR PHYSICAL EXAMINATION RELATED TO EMPLOYMENT: Primary | ICD-10-CM

## 2025-06-02 PROCEDURE — 3008F BODY MASS INDEX DOCD: CPT | Performed by: NURSE PRACTITIONER

## 2025-06-02 PROCEDURE — 3074F SYST BP LT 130 MM HG: CPT | Performed by: NURSE PRACTITIONER

## 2025-06-02 PROCEDURE — 3078F DIAST BP <80 MM HG: CPT | Performed by: NURSE PRACTITIONER

## 2025-06-02 NOTE — PROGRESS NOTES
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 She denies any physical or psychological complaints. She has no concerns about her abilities to perform the duties associated with her job.       Current Medications[1]   Past Medical History[2]   Past Surgical History[3]   Family History[4]   Social History:  Short Social Hx on File[5]   Occ: [employer].      REVIEW OF SYSTEMS:   GENERAL: Feels well. No night sweats. No Fevers.   SKIN: denies any unusual skin lesions  EYES:denies blurred vision or double vision  HEENT: denies nasal congestion, sinus pain or sore throat  LUNGS: denies shortness of breath at rest on on exertion  CARDIOVASCULAR: denies chest pain or palpitations   GI: denies abdominal pain or heartburn.  No N/V/C/D.  MUSCULOSKELETAL: denies back pain or other joint pain  NEURO: denies headaches  PSYCHE: denies SI/HI  HEMATOLOGIC: denies hx of anemia or other bleeding disorders  ENDOCRINE: denies thyroid history  ALLERGY/ASTHMA: Denies hx of seasonal allergies or asthma    EXAM:   /70   Pulse 88   Temp 97.9 °F (36.6 °C)   Resp 18   Ht 5' 5\" (1.651 m)   Wt 165 lb (74.8 kg)   LMP 02/13/2025 (Approximate)   SpO2 98%   BMI 27.46 kg/m²  Body mass index is 27.46 kg/m².     GENERAL: well developed, well nourished,in no apparent distress  SKIN: no rashes,no suspicious lesions  HEENT: atraumatic, normocephalic,ears and throat are clear  EYES:PERRLA, EOMI, normal optic disk,conjunctiva are clear  NECK: supple,no adenopathy,no bruits  CHEST: no chest tenderness  LUNGS: Clear to auscultation bilaterally. No diminished breath sounds. No wheezing, rhonchi, or rales.  CARDIO: RRR without murmur  GI: BS's present x4., No tenderness of palpation.  No obvious masses or palpable organomegaly   MUSCULOSKELETAL: back is not tender, ROM of the back fully  intact  EXTREMITIES: no cyanosis, clubbing or edema  NEURO: Alert & Oriented x3. Cranial nerves are grossly intact.     ASSESSMENT AND PLAN:   Fred Sheriff is a 19 year old female who presents for an employment physical.    ASSESSMENT:     PLAN: Patient was found free of any mental or physical condition which would prevent her from performing her work.  Physical form was filled out. Patient was encouraged to watch exercise and dietary habits.     Form filled out and given to patient.  Form was copied and sent to scanning.     Patient's questions/concerns were addressed and answered. Patient is in agreement with treatment plan.     Patient is to follow up as needed with PCP or here in clinic.         [1]   Current Outpatient Medications   Medication Sig Dispense Refill    Drospirenone-Ethinyl Estradiol (LO-ZUMANDIMINE) 3-0.02 MG Oral Tab Take 1 tablet by mouth daily. 28 tablet 12    citalopram 10 MG Oral Tab Take 1 tablet (10 mg total) by mouth daily. 60 tablet 1   [2]   Past Medical History:   Depression   [3] No past surgical history on file.  [4]   Family History  Problem Relation Age of Onset    Alcohol and Other Disorders Associated Maternal Grandfather     Depression Paternal Grandfather     Bipolar Disorder Paternal Grandfather     Other (Type 1 diabetes) Paternal Grandfather     Alcohol and Other Disorders Associated Paternal Grandfather     Suicide History Paternal Grandfather         attempted    Bipolar Disorder Paternal Aunt     Depression Paternal Aunt     Suicide History Cousin         2 paternal cousins attempted suicide   [5]   Social History  Socioeconomic History    Marital status: Single   Tobacco Use    Smoking status: Never    Smokeless tobacco: Never   Vaping Use    Vaping status: Former    Substances: Nicotine   Substance and Sexual Activity    Alcohol use: Yes     Comment: socially    Drug use: Not Currently    Sexual activity: Yes   Other Topics Concern    Caffeine Concern Yes     Exercise Yes    Seat Belt Yes     Social Drivers of Health      Received from [facility]    Housing Stability

## (undated) NOTE — LETTER
Date: 8/27/2024    Patient Name: Fred Sheriff          To Whom it may concern:    This letter has been written at the patient's request. The above patient was seen at [facility] for treatment of a medical condition.  Please excuse her absence.  Return to work when fever free for 24 hours without fever reducers and symptoms are improving.           Sincerely,    [facility]

## (undated) NOTE — LETTER
Date: 10/1/2024    Patient Name: Fred Sheriff          To Whom it may concern:    This letter has been written at the patient's request. The above patient was seen at [facility] for treatment of a medical condition.    This patient should be excused from attending work today 10/1/2024 due to illness.  She was seen in our clinic and is under our care.        Sincerely,      Nery Holt PA-C

## (undated) NOTE — LETTER
Date: 2/18/2025    Patient Name: Fred Sheriff          To Whom it may concern:     The above patient was seen at [facility] for treatment of a medical condition. Please excuse her absences this week. She can return to work once she is feeling better and is fever free for 24hrs without the use of fever-reducing medications.          Sincerely,    José Miguel Araiza NP

## (undated) NOTE — LETTER
Date: 2/18/2025    Patient Name: Fred Sheriff          To Whom it may concern:     The above patient was seen at [facility] for treatment of a medical condition. Please excuse her absences this week. She can return to work on 2/20/25 as long as she is feeling better and is fever free for 24hrs without the use of fever-reducing medications.          Sincerely,    José Miguel Araiza NP

## (undated) NOTE — LETTER
Date: 6/19/2024    Patient Name: Fred Sheriff          To Whom it may concern:    This letter has been written at the patient's request. The above patient was seen at [facility] for treatment of a medical condition.    This patient should be excused from attending work from 06/18/24 through 06/19/24.    The patient may return to work on 06/20/24 with no limitations as long as fever free for 24 hours without use of fever reducing agents.        Sincerely,      Mariah Alex, APRN

## (undated) NOTE — LETTER
Date & Time: 4/3/2024, 11:05 AM  Patient: Fred Sheriff  Encounter Provider(s):    Marie Stahl APRN       To Whom It May Concern:    Fred Sheriff was seen and treated in our department on 4/3/2024. She can return to work 4/4/24    If you have any questions or concerns, please do not hesitate to call.        _____________________________  Physician/APC Signature